# Patient Record
Sex: FEMALE | Race: WHITE | NOT HISPANIC OR LATINO | URBAN - METROPOLITAN AREA
[De-identification: names, ages, dates, MRNs, and addresses within clinical notes are randomized per-mention and may not be internally consistent; named-entity substitution may affect disease eponyms.]

---

## 2017-04-14 ENCOUNTER — OFFICE (OUTPATIENT)
Dept: URBAN - METROPOLITAN AREA CLINIC 78 | Facility: CLINIC | Age: 27
End: 2017-04-14
Payer: COMMERCIAL

## 2017-04-14 VITALS — TEMPERATURE: 97.7 F | HEIGHT: 56 IN | WEIGHT: 201 LBS

## 2017-04-14 DIAGNOSIS — R13.10 DYSPHAGIA, UNSPECIFIED: ICD-10-CM

## 2017-04-14 DIAGNOSIS — R12 HEARTBURN: ICD-10-CM

## 2017-04-14 DIAGNOSIS — Q90.9 DOWN SYNDROME, UNSPECIFIED: ICD-10-CM

## 2017-04-14 DIAGNOSIS — E03.9 HYPOTHYROIDISM, UNSPECIFIED: ICD-10-CM

## 2017-04-14 PROCEDURE — 99204 OFFICE O/P NEW MOD 45 MIN: CPT

## 2017-04-14 NOTE — SERVICEHPINOTES
NAKIA COTTER   is a   26   year old    female who is being seen in consultation at the request of   JUVENTINO JONAS   for reflux and heartburn and difficulty swallowing. Patient has Down syndrome. Her mother is with her. She has been on Zantac 150 BID for a year - helped at first but no longer helping. On a daily basis she is complaining of stomach discomfort and heartburn. Has to drink fluid to help food go down. Her TSH in January was 167. Her mother does not recall any levo dosage change or f/u lab.

## 2017-05-17 ENCOUNTER — ON CAMPUS - OUTPATIENT (OUTPATIENT)
Dept: URBAN - METROPOLITAN AREA HOSPITAL 63 | Facility: HOSPITAL | Age: 27
End: 2017-05-17
Payer: COMMERCIAL

## 2017-05-17 DIAGNOSIS — B96.81 HELICOBACTER PYLORI [H. PYLORI] AS THE CAUSE OF DISEASES CLA: ICD-10-CM

## 2017-05-17 DIAGNOSIS — K21.9 GASTRO-ESOPHAGEAL REFLUX DISEASE WITHOUT ESOPHAGITIS: ICD-10-CM

## 2017-05-17 DIAGNOSIS — R13.10 DYSPHAGIA, UNSPECIFIED: ICD-10-CM

## 2017-05-17 PROCEDURE — 43239 EGD BIOPSY SINGLE/MULTIPLE: CPT

## 2017-05-24 ENCOUNTER — OFFICE (OUTPATIENT)
Dept: URBAN - METROPOLITAN AREA CLINIC 78 | Facility: CLINIC | Age: 27
End: 2017-05-24
Payer: COMMERCIAL

## 2017-05-24 VITALS
HEIGHT: 56 IN | TEMPERATURE: 98.1 F | DIASTOLIC BLOOD PRESSURE: 97 MMHG | WEIGHT: 198 LBS | HEART RATE: 78 BPM | SYSTOLIC BLOOD PRESSURE: 125 MMHG

## 2017-05-24 DIAGNOSIS — Q90.9 DOWN SYNDROME, UNSPECIFIED: ICD-10-CM

## 2017-05-24 DIAGNOSIS — R13.10 DYSPHAGIA, UNSPECIFIED: ICD-10-CM

## 2017-05-24 DIAGNOSIS — E03.9 HYPOTHYROIDISM, UNSPECIFIED: ICD-10-CM

## 2017-05-24 DIAGNOSIS — B96.81 HELICOBACTER PYLORI [H. PYLORI] AS THE CAUSE OF DISEASES CLA: ICD-10-CM

## 2017-05-24 DIAGNOSIS — R12 HEARTBURN: ICD-10-CM

## 2017-05-24 PROCEDURE — 99213 OFFICE O/P EST LOW 20 MIN: CPT

## 2017-05-24 RX ORDER — LANSOPRAZOLE, AMOXICILLIN AND CLARITHROMYCIN 30-500-500
KIT ORAL
Qty: 1 | Refills: 0 | Status: COMPLETED
Start: 2017-05-24 | End: 2017-08-24

## 2017-05-24 NOTE — SERVICEHPINOTES
27 yo female presents for f/u reflux and heartburn and difficulty swallowing. Patient has Down syndrome. Her mother is with her. Her EGD revealed normal esophagus (including bx) but H pylori positive gastritis. Duodenum appeared normal, no biopsies taken. She has not been having any further problems with swallowing and only complained of reflux once in past few weeks on her daily ranitidine. Omeprazole 20 mg was sent to pharmacy but they haven't gotten it yet. Her TSH in January was 167. Unclear what status of this is currently but they are seeing PCP about this.

## 2017-08-24 ENCOUNTER — OFFICE (OUTPATIENT)
Dept: URBAN - METROPOLITAN AREA CLINIC 78 | Facility: CLINIC | Age: 27
End: 2017-08-24
Payer: COMMERCIAL

## 2017-08-24 VITALS
SYSTOLIC BLOOD PRESSURE: 110 MMHG | DIASTOLIC BLOOD PRESSURE: 88 MMHG | WEIGHT: 200 LBS | HEART RATE: 68 BPM | HEIGHT: 56 IN | TEMPERATURE: 97.4 F

## 2017-08-24 DIAGNOSIS — R12 HEARTBURN: ICD-10-CM

## 2017-08-24 DIAGNOSIS — E03.9 HYPOTHYROIDISM, UNSPECIFIED: ICD-10-CM

## 2017-08-24 DIAGNOSIS — Q90.9 DOWN SYNDROME, UNSPECIFIED: ICD-10-CM

## 2017-08-24 PROCEDURE — 99213 OFFICE O/P EST LOW 20 MIN: CPT

## 2017-08-24 RX ORDER — RANITIDINE 75 MG/1
TABLET, FILM COATED ORAL
Qty: 60 | Refills: 5 | Status: COMPLETED
End: 2018-06-20

## 2017-08-24 NOTE — SERVICEHPINOTES
27 yo female presents for f/u reflux and heartburn and difficulty swallowing. Patient has Down syndrome. Her mother is with her. Her EGD revealed normal esophagus (including bx) but H pylori positive gastritis. She took Prevpac equivalent for 2 weeks in late May/early June and did well with that. Since then has only had occasional heartburn and is using omeprazole or Zantac prn - about once a week. No further dysphagia issues and they report that her thyroid medication was adjusted (her TSH in January was 167). Doing well, no complaints today.

## 2018-06-20 ENCOUNTER — OFFICE (OUTPATIENT)
Dept: URBAN - METROPOLITAN AREA CLINIC 78 | Facility: CLINIC | Age: 28
End: 2018-06-20
Payer: COMMERCIAL

## 2018-06-20 VITALS
WEIGHT: 208 LBS | SYSTOLIC BLOOD PRESSURE: 129 MMHG | DIASTOLIC BLOOD PRESSURE: 88 MMHG | TEMPERATURE: 98.5 F | HEIGHT: 56 IN | HEART RATE: 84 BPM

## 2018-06-20 DIAGNOSIS — B96.81 HELICOBACTER PYLORI [H. PYLORI] AS THE CAUSE OF DISEASES CLA: ICD-10-CM

## 2018-06-20 DIAGNOSIS — Q90.9 DOWN SYNDROME, UNSPECIFIED: ICD-10-CM

## 2018-06-20 DIAGNOSIS — R10.13 EPIGASTRIC PAIN: ICD-10-CM

## 2018-06-20 PROCEDURE — 99213 OFFICE O/P EST LOW 20 MIN: CPT

## 2018-06-20 NOTE — SERVICEHPINOTES
Ms. Lam is here to discuss stomach pain. She has been following with our office for a year regarding the pain. She underwent an EGD which showed h pylori gastritis. She was tx with a Prevpac. She saw Jaki for f/u regarding symptoms but at last check, she was asymptomatic so she and her mother denied an h pylori breath test to confirm eradication.  Her mom provides her history. Pain has been present in the epigastrium x 1 week but in past few days, she is asymptomatic. She is eating well and regular BMs. No regular NSAID use.  No other GI related complaints today.

## 2018-06-25 LAB — H PYLORI BREATH TEST: POSITIVE

## 2018-08-10 ENCOUNTER — OFFICE (OUTPATIENT)
Dept: URBAN - METROPOLITAN AREA CLINIC 78 | Facility: CLINIC | Age: 28
End: 2018-08-10
Payer: COMMERCIAL

## 2018-08-10 VITALS
SYSTOLIC BLOOD PRESSURE: 124 MMHG | TEMPERATURE: 97.7 F | HEART RATE: 64 BPM | DIASTOLIC BLOOD PRESSURE: 68 MMHG | WEIGHT: 210 LBS | HEIGHT: 56 IN

## 2018-08-10 DIAGNOSIS — R10.9 UNSPECIFIED ABDOMINAL PAIN: ICD-10-CM

## 2018-08-10 DIAGNOSIS — B96.81 HELICOBACTER PYLORI [H. PYLORI] AS THE CAUSE OF DISEASES CLA: ICD-10-CM

## 2018-08-10 PROCEDURE — 99213 OFFICE O/P EST LOW 20 MIN: CPT

## 2018-08-10 NOTE — SERVICEHPINOTES
Miss Lam is a 27 yr old female with Down's syndrome here to discuss abdominal pain. Patient's hx is provided by her mother.    She has been following with our office for a year regarding the pain. She underwent an EGD which showed h pylori gastritis. She was tx with a Prevpac. A breath test was then updated which showed still + for h pylori. She was then tx with Pylera. She no longer has reflux but hold her stomach often in the RUQ or LUQ. No other symptoms are present per her mother. Her mother also tries to have her adhere to a low acid diet.

## 2019-04-17 ENCOUNTER — OFFICE (OUTPATIENT)
Dept: URBAN - METROPOLITAN AREA CLINIC 78 | Facility: CLINIC | Age: 29
End: 2019-04-17
Payer: COMMERCIAL

## 2019-04-17 VITALS
DIASTOLIC BLOOD PRESSURE: 104 MMHG | HEART RATE: 86 BPM | HEIGHT: 56 IN | SYSTOLIC BLOOD PRESSURE: 122 MMHG | WEIGHT: 218 LBS | TEMPERATURE: 97.9 F

## 2019-04-17 DIAGNOSIS — R14.3 FLATULENCE: ICD-10-CM

## 2019-04-17 DIAGNOSIS — B96.81 HELICOBACTER PYLORI [H. PYLORI] AS THE CAUSE OF DISEASES CLA: ICD-10-CM

## 2019-04-17 DIAGNOSIS — K29.60 OTHER GASTRITIS WITHOUT BLEEDING: ICD-10-CM

## 2019-04-17 DIAGNOSIS — R10.31 RIGHT LOWER QUADRANT PAIN: ICD-10-CM

## 2019-04-17 PROCEDURE — 99214 OFFICE O/P EST MOD 30 MIN: CPT

## 2019-04-17 NOTE — SERVICEHPINOTES
Ms. Lam is here for follow up.   Patient's hx is provided by her mother as she has Down's syndrome and is unable to give her history. She has been following with our office for two years regarding abdominal pain. She underwent an EGD which showed h pylori gastritis. She was tx with a Prevpac. A breath test was then updated which showed still + for h pylori. She was then tx with Pylera. A repeat breath test was ordered but not completed by the patient. She also underwent an abdominal US and was found to have cholelithiasis. She underwent a cholecystectomy in the Fall. Today, her main symptom is gas (flatulence) especially in the mornings upon waking. She also gets pain in her abdomen prior to a BM which is alleviated by a BM. No blood in her stools. Stools are "normal looking" per her mother. No weight loss. No family hx of colon cancer or other pathology. BR

## 2019-05-13 LAB
H PYLORI BREATH TEST: NEGATIVE
H. PYLORI BREATH COLLECTION: (no result)

## 2019-07-26 ENCOUNTER — OFFICE (OUTPATIENT)
Dept: URBAN - METROPOLITAN AREA CLINIC 78 | Facility: CLINIC | Age: 29
End: 2019-07-26

## 2019-07-26 VITALS
HEART RATE: 59 BPM | DIASTOLIC BLOOD PRESSURE: 54 MMHG | TEMPERATURE: 98 F | HEIGHT: 56 IN | WEIGHT: 220 LBS | SYSTOLIC BLOOD PRESSURE: 103 MMHG

## 2019-07-26 DIAGNOSIS — R14.3 FLATULENCE: ICD-10-CM

## 2019-07-26 DIAGNOSIS — Q90.9 DOWN SYNDROME, UNSPECIFIED: ICD-10-CM

## 2019-07-26 DIAGNOSIS — R10.31 RIGHT LOWER QUADRANT PAIN: ICD-10-CM

## 2019-07-26 DIAGNOSIS — R10.32 LEFT LOWER QUADRANT PAIN: ICD-10-CM

## 2019-07-26 PROCEDURE — 99214 OFFICE O/P EST MOD 30 MIN: CPT

## 2019-07-26 PROCEDURE — 00002: CPT

## 2020-09-03 ENCOUNTER — OFFICE (OUTPATIENT)
Dept: URBAN - METROPOLITAN AREA TELEHEALTH 7 | Facility: TELEHEALTH | Age: 30
End: 2020-09-03
Payer: MEDICAID

## 2020-09-03 VITALS — HEIGHT: 56 IN | WEIGHT: 200 LBS

## 2020-09-03 DIAGNOSIS — Q90.9 DOWN SYNDROME, UNSPECIFIED: ICD-10-CM

## 2020-09-03 DIAGNOSIS — R10.84 GENERALIZED ABDOMINAL PAIN: ICD-10-CM

## 2020-09-03 PROCEDURE — 99214 OFFICE O/P EST MOD 30 MIN: CPT | Mod: 95 | Performed by: PHYSICIAN ASSISTANT

## 2020-09-03 RX ORDER — CROMOLYN SODIUM 20 MG/ML
SOLUTION ORAL
Qty: 1000 | Refills: 5 | Status: ACTIVE
Start: 2020-09-03

## 2020-09-03 NOTE — SERVICEHPINOTES
PATIENT VERIFIED BY DATE OF BIRTH AND NAME. Patient has been consented for this telecommunication visit. 28 yo WF with Mary presents with her mother for abdominal pain. Patient has long h/o abdominal pains and had EGD (treated for H pylori) and U/S in the past. Patient was last seen by us in July 2019 at which time an xray was fine and she was prescribed Bentyl. Patient's mother says this made her too sleepy. Patient had her GB removed in 2019 due to gallstones and also ended up seeing another GI later last year due to insurance change - reports negative stool test for H pylori through that provider. Patient's mother has worked with patient's diet as patient does better on low FODMAPs - however she finds this challenging and would like to work with a nutritionist. Patient seems to primarily have gas-related symptoms, including belching and heartburn at times. She is on omeprazole 20 mg once daily currently. IBgard samples helped somewhat in past. Reports regular BMs. ROS as above, otherwise negative.

## 2020-09-25 ENCOUNTER — OFFICE (OUTPATIENT)
Dept: URBAN - METROPOLITAN AREA CLINIC 79 | Facility: CLINIC | Age: 30
End: 2020-09-25
Payer: MEDICAID

## 2020-09-25 VITALS
HEART RATE: 75 BPM | WEIGHT: 211 LBS | DIASTOLIC BLOOD PRESSURE: 44 MMHG | TEMPERATURE: 97.2 F | HEIGHT: 56 IN | SYSTOLIC BLOOD PRESSURE: 117 MMHG

## 2020-09-25 DIAGNOSIS — R10.11 RIGHT UPPER QUADRANT PAIN: ICD-10-CM

## 2020-09-25 DIAGNOSIS — R10.84 GENERALIZED ABDOMINAL PAIN: ICD-10-CM

## 2020-09-25 DIAGNOSIS — R19.7 DIARRHEA, UNSPECIFIED: ICD-10-CM

## 2020-09-25 PROCEDURE — 99214 OFFICE O/P EST MOD 30 MIN: CPT | Performed by: PHYSICIAN ASSISTANT

## 2020-09-25 RX ORDER — FAMOTIDINE 20 MG/1
TABLET, FILM COATED ORAL
Qty: 30 | Refills: 0 | Status: COMPLETED
Start: 2020-09-25 | End: 2020-12-04

## 2020-09-25 NOTE — SERVICEHPINOTES
30 yo WF with Downs presents with her mother for ongoing abdominal pain and now with diarrhea for the past 3 days with worse complaints of abdominal pain - more focused in RUQ area. Has diarrhea 5+ times per day. No blood in stools. No fevers or N/V. Patient seen for routine abd pain visit via TM visit a few weeks ago - hasn't started cromolyn yet and hasn't been approved yet to see a nutritionist - apparently has to be ordered by PCP. Patient's mother is concerned about possible H pylori again too. Patient is on omeprazole 20 mg daily.Prior hx: Patient has long h/o abdominal pains and had EGD in 2017 (treated for H pylori) and U/S in the past. Patient was seen by us in July 2019 at which time an xray was fine and she was prescribed Bentyl. Patient's mother says this made her too sleepy. Patient had her GB removed in 2019 due to gallstones (including h/o bile duct stone per mother). Patient's mother has worked with patient's diet as patient does better on low FODMAPs. Patient can have gas-related symptoms, including belching and heartburn at times. IBgard samples helped somewhat in past. BR

## 2020-12-04 ENCOUNTER — OFFICE (OUTPATIENT)
Dept: URBAN - METROPOLITAN AREA CLINIC 79 | Facility: CLINIC | Age: 30
End: 2020-12-04
Payer: MEDICAID

## 2020-12-04 VITALS
WEIGHT: 213 LBS | SYSTOLIC BLOOD PRESSURE: 116 MMHG | HEART RATE: 68 BPM | HEIGHT: 56 IN | TEMPERATURE: 98 F | DIASTOLIC BLOOD PRESSURE: 71 MMHG

## 2020-12-04 DIAGNOSIS — K58.9 IRRITABLE BOWEL SYNDROME WITHOUT DIARRHEA: ICD-10-CM

## 2020-12-04 PROCEDURE — 99214 OFFICE O/P EST MOD 30 MIN: CPT | Performed by: PHYSICIAN ASSISTANT

## 2020-12-04 RX ORDER — HYOSCYAMINE SULFATE 0.12 MG/1
TABLET, ORALLY DISINTEGRATING ORAL
Qty: 60 | Refills: 1 | Status: ACTIVE
Start: 2020-12-04

## 2020-12-04 NOTE — SERVICEHPINOTES
31 yo WF with Downs presents with her mother for f/u digestive problems. Her stool tests in October showed enteropathogenic E coli and borderline elevated fecal calpro. She was treated with Cipro and has been doing somewhat better. A f/u fecal calpro was normal. Mother notes patient is needing to have treatment for sleep apnea. She had benefit with taking GastroCrom but her mother says she won't drink it now. She is on probiotics. Still can have pain that seems worse around times of BMs. Prior hx: Patient has long h/o abdominal pains and had EGD in 2017 (treated for H pylori) and U/S in the past. Patient was seen by us in July 2019 at which time an xray was fine and she was prescribed Bentyl. Patient's mother says this made her too sleepy. Patient had her GB removed in 2019 due to gallstones (including h/o bile duct stone per mother). Patient's mother has worked with patient's diet as patient does better on low FODMAPs. Patient can have gas-related symptoms, including belching and heartburn at times. IBgard samples helped somewhat in past.11/13/20 fecal calpro 44BR10/20/20 fecal calpro 112, H pylori stool neg, E coli POS, AST/ALT 31/40, bili 1.3